# Patient Record
Sex: MALE | Race: WHITE
[De-identification: names, ages, dates, MRNs, and addresses within clinical notes are randomized per-mention and may not be internally consistent; named-entity substitution may affect disease eponyms.]

---

## 2020-12-04 ENCOUNTER — HOSPITAL ENCOUNTER (EMERGENCY)
Dept: HOSPITAL 53 - M ED | Age: 45
LOS: 1 days | Discharge: ANOTHER HOSPITAL | End: 2020-12-05
Payer: COMMERCIAL

## 2020-12-04 VITALS — WEIGHT: 205.43 LBS | HEIGHT: 72 IN | BODY MASS INDEX: 27.82 KG/M2
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
## 2020-12-04 DIAGNOSIS — S02.401A: ICD-10-CM
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
## 2020-12-04 DIAGNOSIS — S06.6X9A: Primary | ICD-10-CM

## 2020-12-04 DIAGNOSIS — S06.5X0A: ICD-10-CM
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
## 2020-12-04 DIAGNOSIS — S02.2XXA: ICD-10-CM

## 2020-12-04 PROCEDURE — 93041 RHYTHM ECG TRACING: CPT

## 2020-12-04 PROCEDURE — 96375 TX/PRO/DX INJ NEW DRUG ADDON: CPT

## 2020-12-04 PROCEDURE — 31500 INSERT EMERGENCY AIRWAY: CPT

## 2020-12-04 PROCEDURE — 51702 INSERT TEMP BLADDER CATH: CPT

## 2020-12-04 PROCEDURE — 94760 N-INVAS EAR/PLS OXIMETRY 1: CPT

## 2020-12-04 PROCEDURE — 85730 THROMBOPLASTIN TIME PARTIAL: CPT

## 2020-12-04 PROCEDURE — 87631 RESP VIRUS 3-5 TARGETS: CPT

## 2020-12-04 PROCEDURE — 96374 THER/PROPH/DIAG INJ IV PUSH: CPT

## 2020-12-04 PROCEDURE — 70486 CT MAXILLOFACIAL W/O DYE: CPT

## 2020-12-04 PROCEDURE — 86850 RBC ANTIBODY SCREEN: CPT

## 2020-12-04 PROCEDURE — 70450 CT HEAD/BRAIN W/O DYE: CPT

## 2020-12-04 PROCEDURE — 99291 CRITICAL CARE FIRST HOUR: CPT

## 2020-12-04 PROCEDURE — 72125 CT NECK SPINE W/O DYE: CPT

## 2020-12-04 PROCEDURE — 80048 BASIC METABOLIC PNL TOTAL CA: CPT

## 2020-12-04 PROCEDURE — 85610 PROTHROMBIN TIME: CPT

## 2020-12-04 PROCEDURE — 85025 COMPLETE CBC W/AUTO DIFF WBC: CPT

## 2020-12-04 PROCEDURE — 80307 DRUG TEST PRSMV CHEM ANLYZR: CPT

## 2020-12-04 PROCEDURE — 86901 BLOOD TYPING SEROLOGIC RH(D): CPT

## 2020-12-04 PROCEDURE — 86900 BLOOD TYPING SEROLOGIC ABO: CPT

## 2020-12-04 PROCEDURE — 71045 X-RAY EXAM CHEST 1 VIEW: CPT
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
## 2020-12-05 VITALS — DIASTOLIC BLOOD PRESSURE: 52 MMHG | SYSTOLIC BLOOD PRESSURE: 102 MMHG

## 2020-12-05 LAB
AMPHETAMINES UR QL SCN: NEGATIVE
APTT BLD: 27.2 SECONDS (ref 24.2–38.5)
BARBITURATES UR QL SCN: NEGATIVE
BASOPHILS # BLD AUTO: 0.1 10^3/UL (ref 0–0.2)
BASOPHILS NFR BLD AUTO: 0.5 % (ref 0–1)
BENZODIAZ UR QL SCN: NEGATIVE
BUN SERPL-MCNC: 11 MG/DL (ref 7–18)
BZE UR QL SCN: NEGATIVE
CALCIUM SERPL-MCNC: 8.8 MG/DL (ref 8.5–10.1)
CANNABINOIDS UR QL SCN: POSITIVE
CHLORIDE SERPL-SCNC: 105 MEQ/L (ref 98–107)
CO2 SERPL-SCNC: 28 MEQ/L (ref 21–32)
CREAT SERPL-MCNC: 1.07 MG/DL (ref 0.7–1.3)
EOSINOPHIL # BLD AUTO: 0 10^3/UL (ref 0–0.5)
EOSINOPHIL NFR BLD AUTO: 0.1 % (ref 0–3)
ETHANOL SERPL-MCNC: 0.29 % (ref 0–0.01)
GFR SERPL CREATININE-BSD FRML MDRD: > 60 ML/MIN/{1.73_M2} (ref 60–?)
GLUCOSE SERPL-MCNC: 112 MG/DL (ref 70–100)
HCT VFR BLD AUTO: 46.5 % (ref 42–52)
HGB BLD-MCNC: 16.4 G/DL (ref 13.5–17.5)
INR PPP: 0.92
LYMPHOCYTES # BLD AUTO: 1.6 10^3/UL (ref 1.5–5)
LYMPHOCYTES NFR BLD AUTO: 11.7 % (ref 24–44)
MCH RBC QN AUTO: 32.5 PG (ref 27–33)
MCHC RBC AUTO-ENTMCNC: 35.3 G/DL (ref 32–36.5)
MCV RBC AUTO: 92.3 FL (ref 80–96)
METHADONE UR QL SCN: NEGATIVE
MONOCYTES # BLD AUTO: 0.5 10^3/UL (ref 0–0.8)
MONOCYTES NFR BLD AUTO: 3.7 % (ref 0–5)
NEUTROPHILS # BLD AUTO: 11.1 10^3/UL (ref 1.5–8.5)
NEUTROPHILS NFR BLD AUTO: 82.7 % (ref 36–66)
OPIATES UR QL SCN: NEGATIVE
PCP UR QL SCN: NEGATIVE
PLATELET # BLD AUTO: 230 10^3/UL (ref 150–450)
POTASSIUM SERPL-SCNC: 4 MEQ/L (ref 3.5–5.1)
PROTHROMBIN TIME: 12.6 SECONDS (ref 12.5–14.3)
RBC # BLD AUTO: 5.04 10^6/UL (ref 4.3–6.1)
SODIUM SERPL-SCNC: 142 MEQ/L (ref 136–145)
WBC # BLD AUTO: 13.4 10^3/UL (ref 4–10)
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
